# Patient Record
Sex: MALE | Race: WHITE | NOT HISPANIC OR LATINO | URBAN - METROPOLITAN AREA
[De-identification: names, ages, dates, MRNs, and addresses within clinical notes are randomized per-mention and may not be internally consistent; named-entity substitution may affect disease eponyms.]

---

## 2022-08-10 ENCOUNTER — NEW PATIENT (OUTPATIENT)
Dept: URBAN - METROPOLITAN AREA CLINIC 21 | Facility: CLINIC | Age: 42
End: 2022-08-10

## 2022-08-10 DIAGNOSIS — H43.393: ICD-10-CM

## 2022-08-10 DIAGNOSIS — H52.223: ICD-10-CM

## 2022-08-10 PROCEDURE — 92004 COMPRE OPH EXAM NEW PT 1/>: CPT

## 2022-08-10 PROCEDURE — 92015 DETERMINE REFRACTIVE STATE: CPT

## 2022-08-10 PROCEDURE — 92250 FUNDUS PHOTOGRAPHY W/I&R: CPT

## 2022-08-10 ASSESSMENT — KERATOMETRY
OD_K1POWER_DIOPTERS: 43.00
OS_K1POWER_DIOPTERS: 43.25
OD_AXISANGLE_DEGREES: 172
OS_K2POWER_DIOPTERS: 44.00
OD_AXISANGLE2_DEGREES: 82
OS_AXISANGLE2_DEGREES: 109
OS_AXISANGLE_DEGREES: 19
OD_K2POWER_DIOPTERS: 44.00

## 2022-08-10 ASSESSMENT — TONOMETRY
OS_IOP_MMHG: 11
OD_IOP_MMHG: 14

## 2022-08-10 ASSESSMENT — VISUAL ACUITY
OD_SC: 20/20
OS_SC: 20/20
OU_SC: 20/20

## 2022-08-16 ENCOUNTER — OFFICE VISIT (OUTPATIENT)
Dept: URBAN - METROPOLITAN AREA CLINIC 72 | Facility: CLINIC | Age: 42
End: 2022-08-16

## 2022-11-11 ENCOUNTER — OFFICE VISIT (OUTPATIENT)
Dept: URBAN - METROPOLITAN AREA CLINIC 72 | Facility: CLINIC | Age: 42
End: 2022-11-11

## 2024-01-24 ENCOUNTER — OFFICE VISIT (OUTPATIENT)
Dept: URBAN - METROPOLITAN AREA CLINIC 98 | Facility: CLINIC | Age: 44
End: 2024-01-24

## 2024-02-06 ENCOUNTER — OV NP (OUTPATIENT)
Dept: URBAN - METROPOLITAN AREA CLINIC 98 | Facility: CLINIC | Age: 44
End: 2024-02-06

## 2024-02-26 ENCOUNTER — OV NP (OUTPATIENT)
Dept: URBAN - METROPOLITAN AREA CLINIC 98 | Facility: CLINIC | Age: 44
End: 2024-02-26
Payer: COMMERCIAL

## 2024-02-26 VITALS
BODY MASS INDEX: 22.89 KG/M2 | DIASTOLIC BLOOD PRESSURE: 85 MMHG | HEART RATE: 64 BPM | HEIGHT: 78 IN | SYSTOLIC BLOOD PRESSURE: 122 MMHG | WEIGHT: 197.8 LBS | TEMPERATURE: 97 F

## 2024-02-26 DIAGNOSIS — Z79.620 LONG TERM (CURRENT) USE OF IMMUNOSUPPRESSIVE BIOLOGIC: ICD-10-CM

## 2024-02-26 DIAGNOSIS — K50.80 CROHN'S DISEASE OF BOTH SMALL AND LARGE INTESTINE WITHOUT COMPLICATION: ICD-10-CM

## 2024-02-26 DIAGNOSIS — Z80.0 FAMILY HX OF COLON CANCER: ICD-10-CM

## 2024-02-26 PROBLEM — 71833008: Status: ACTIVE | Noted: 2024-02-26

## 2024-02-26 PROCEDURE — 99205 OFFICE O/P NEW HI 60 MIN: CPT | Performed by: INTERNAL MEDICINE

## 2024-02-26 RX ORDER — ADALIMUMAB 40MG/0.4ML
INJECT 40 MG KIT SUBCUTANEOUS EVERY OTHER WEEK
Status: ACTIVE | COMMUNITY

## 2024-02-26 NOTE — HPI-TODAY'S VISIT:
44 yo male with CD dx 2016 comes in for continue CD care. Was recently seen at [place] by Geoff Billy at [place]. Last colonoscopy was by dr. Billy but by Dr. Rg due to no sedation. Findings showed nl exam except for pseudopolyps in the sigmoid Last colonoscopy was 11/28/28.  No other family members with IBD. Father recently dx with colon cancer at age 73, no IBD. He had polyps in the past.  No IBD syx. Chronic gas.  No other medical conditions.  Received Pred for a months and Humira induction and maintenance starting 2016, at time of dx. Lab review from 2022 shows Vit D of 19 and is on 50,000 q week and 2000 q day Last calprotectin in 2022 was 100.(not deep remission) Nl esr and crp.

## 2024-02-26 NOTE — PHYSICAL EXAM RECTAL:
normal tone, no external hemorrhoids, no masses palpable, no red blood, Tenderness on DORIAN, Internal hemorrhoids present

## 2024-03-12 ENCOUNTER — LAB (OUTPATIENT)
Dept: URBAN - METROPOLITAN AREA CLINIC 4 | Facility: CLINIC | Age: 44
End: 2024-03-12
Payer: COMMERCIAL

## 2024-03-12 ENCOUNTER — COL/EGD (OUTPATIENT)
Dept: URBAN - METROPOLITAN AREA SURGERY CENTER 18 | Facility: SURGERY CENTER | Age: 44
End: 2024-03-12
Payer: COMMERCIAL

## 2024-03-12 DIAGNOSIS — K50.10 CC (CROHN'S COLITIS): ICD-10-CM

## 2024-03-12 DIAGNOSIS — K63.89 OTHER SPECIFIED DISEASES OF INTESTINE: ICD-10-CM

## 2024-03-12 DIAGNOSIS — K31.89 OTHER DISEASES OF STOMACH AND DUODENUM: ICD-10-CM

## 2024-03-12 DIAGNOSIS — K21.9 ACID REFLUX: ICD-10-CM

## 2024-03-12 DIAGNOSIS — K29.70 GASTRITIS, UNSPECIFIED, WITHOUT BLEEDING: ICD-10-CM

## 2024-03-12 DIAGNOSIS — K29.80 ACUTE DUODENITIS: ICD-10-CM

## 2024-03-12 PROCEDURE — G8907 PT DOC NO EVENTS ON DISCHARG: HCPCS | Performed by: INTERNAL MEDICINE

## 2024-03-12 PROCEDURE — 88312 SPECIAL STAINS GROUP 1: CPT | Performed by: PATHOLOGY

## 2024-03-12 PROCEDURE — 43239 EGD BIOPSY SINGLE/MULTIPLE: CPT | Performed by: INTERNAL MEDICINE

## 2024-03-12 PROCEDURE — 88305 TISSUE EXAM BY PATHOLOGIST: CPT | Performed by: PATHOLOGY

## 2024-03-12 PROCEDURE — 45380 COLONOSCOPY AND BIOPSY: CPT | Performed by: INTERNAL MEDICINE

## 2024-03-12 RX ORDER — ADALIMUMAB 40MG/0.4ML
INJECT 40 MG KIT SUBCUTANEOUS EVERY OTHER WEEK
Qty: 6 PEN NEEDLE | Refills: 3 | Status: ACTIVE | COMMUNITY
End: 2025-02-03

## 2024-05-01 ENCOUNTER — WEB ENCOUNTER (OUTPATIENT)
Dept: URBAN - METROPOLITAN AREA CLINIC 98 | Facility: CLINIC | Age: 44
End: 2024-05-01

## 2024-05-01 RX ORDER — ADALIMUMAB-ADAZ 40 MG/.4ML
INJECT 40 MG INJECTION, SOLUTION SUBCUTANEOUS EVERY OTHER WEEK
Qty: 6 PEN NEEDLE | Refills: 3 | OUTPATIENT
Start: 2024-05-02 | End: 2025-04-03

## 2024-05-01 RX ORDER — ADALIMUMAB 40MG/0.4ML
INJECT 40 MG KIT SUBCUTANEOUS EVERY OTHER WEEK
Qty: 6 PEN NEEDLE | Refills: 3 | Status: DISCONTINUED | COMMUNITY
End: 2025-02-03

## 2024-05-28 ENCOUNTER — WEB ENCOUNTER (OUTPATIENT)
Dept: URBAN - METROPOLITAN AREA CLINIC 98 | Facility: CLINIC | Age: 44
End: 2024-05-28

## 2024-05-28 RX ORDER — ADALIMUMAB 40MG/0.4ML
INJECT 40 MG KIT SUBCUTANEOUS EVERY OTHER WEEK
Qty: 6 PEN NEEDLE | Refills: 3 | OUTPATIENT
Start: 2024-05-29 | End: 2025-04-30

## 2024-07-17 ENCOUNTER — TELEPHONE ENCOUNTER (OUTPATIENT)
Dept: URBAN - METROPOLITAN AREA CLINIC 98 | Facility: CLINIC | Age: 44
End: 2024-07-17

## 2024-07-17 RX ORDER — ADALIMUMAB-ADAZ 40 MG/.4ML
INJECT 40 MG INJECTION, SOLUTION SUBCUTANEOUS EVERY OTHER WEEK
Qty: 6 PEN NEEDLE | Refills: 3 | Status: ACTIVE | COMMUNITY
Start: 2024-05-02 | End: 2025-06-18

## 2024-07-17 RX ORDER — ADALIMUMAB 40MG/0.4ML
INJECT 40 MG KIT SUBCUTANEOUS EVERY OTHER WEEK
Qty: 6 PEN NEEDLE | Refills: 3 | Status: DISCONTINUED | COMMUNITY
Start: 2024-05-29 | End: 2025-04-30

## 2024-07-17 RX ORDER — ADALIMUMAB-ADAZ 40 MG/.4ML
INJECT 40 MG INJECTION, SOLUTION SUBCUTANEOUS EVERY OTHER WEEK
Qty: 6 PEN NEEDLE | Refills: 3
Start: 2024-05-02 | End: 2025-06-18

## 2024-07-18 ENCOUNTER — TELEPHONE ENCOUNTER (OUTPATIENT)
Dept: URBAN - METROPOLITAN AREA CLINIC 98 | Facility: CLINIC | Age: 44
End: 2024-07-18

## 2024-07-18 RX ORDER — ADALIMUMAB-ADAZ 40 MG/.4ML
INJECT 40 MG INJECTION, SOLUTION SUBCUTANEOUS EVERY OTHER WEEK
Qty: 6 PEN NEEDLE | Refills: 3
Start: 2024-05-02 | End: 2025-06-19

## 2024-07-19 ENCOUNTER — WEB ENCOUNTER (OUTPATIENT)
Dept: URBAN - METROPOLITAN AREA CLINIC 98 | Facility: CLINIC | Age: 44
End: 2024-07-19

## 2024-07-23 ENCOUNTER — TELEPHONE ENCOUNTER (OUTPATIENT)
Dept: URBAN - METROPOLITAN AREA CLINIC 98 | Facility: CLINIC | Age: 44
End: 2024-07-23

## 2024-07-23 RX ORDER — ADALIMUMAB 40MG/0.4ML
INJECT 40 MG KIT SUBCUTANEOUS EVERY OTHER WEEK
Qty: 6 PEN NEEDLE | Refills: 3
End: 2025-06-24

## 2024-07-23 RX ORDER — ADALIMUMAB 40MG/0.4ML
INJECT 40 MG KIT SUBCUTANEOUS EVERY OTHER WEEK
Status: ACTIVE | COMMUNITY

## 2024-07-23 RX ORDER — ADALIMUMAB 40MG/0.4ML
INJECT 40 MG KIT SUBCUTANEOUS EVERY OTHER WEEK
Qty: 6 PEN NEEDLE | Refills: 3

## 2024-07-23 RX ORDER — ADALIMUMAB-ADAZ 40 MG/.4ML
INJECT 40 MG INJECTION, SOLUTION SUBCUTANEOUS EVERY OTHER WEEK
Qty: 6 PEN NEEDLE | Refills: 3 | Status: DISCONTINUED | COMMUNITY
Start: 2024-05-02 | End: 2025-06-19

## 2024-09-18 ENCOUNTER — TELEPHONE ENCOUNTER (OUTPATIENT)
Dept: URBAN - METROPOLITAN AREA CLINIC 98 | Facility: CLINIC | Age: 44
End: 2024-09-18

## 2024-09-18 RX ORDER — ADALIMUMAB-ADAZ 40 MG/.4ML
INJECT 40 MG INJECTION, SOLUTION SUBCUTANEOUS EVERY OTHER WEEK
Qty: 6 PEN NEEDLE | Refills: 3 | OUTPATIENT
Start: 2024-09-18 | End: 2025-08-20

## 2024-10-02 ENCOUNTER — TELEPHONE ENCOUNTER (OUTPATIENT)
Dept: URBAN - METROPOLITAN AREA CLINIC 98 | Facility: CLINIC | Age: 44
End: 2024-10-02

## 2024-10-02 RX ORDER — ADALIMUMAB-ADAZ 40 MG/.4ML
INJECT 40 MG INJECTION, SOLUTION SUBCUTANEOUS EVERY OTHER WEEK
Qty: 6 PEN NEEDLE | Refills: 3
Start: 2024-09-18 | End: 2025-09-03

## 2024-10-03 ENCOUNTER — TELEPHONE ENCOUNTER (OUTPATIENT)
Dept: URBAN - METROPOLITAN AREA CLINIC 98 | Facility: CLINIC | Age: 44
End: 2024-10-03

## 2025-06-24 ENCOUNTER — TELEPHONE ENCOUNTER (OUTPATIENT)
Dept: URBAN - METROPOLITAN AREA CLINIC 98 | Facility: CLINIC | Age: 45
End: 2025-06-24

## 2025-06-24 ENCOUNTER — WEB ENCOUNTER (OUTPATIENT)
Dept: URBAN - METROPOLITAN AREA CLINIC 98 | Facility: CLINIC | Age: 45
End: 2025-06-24

## 2025-06-24 RX ORDER — METHYLPREDNISOLONE 4 MG/1
AS DIRECTED TABLET ORAL ONCE DAILY
Qty: 21 TABLETS | Refills: 0 | OUTPATIENT
Start: 2025-06-24

## 2025-06-26 ENCOUNTER — TELEPHONE ENCOUNTER (OUTPATIENT)
Dept: URBAN - METROPOLITAN AREA CLINIC 98 | Facility: CLINIC | Age: 45
End: 2025-06-26

## 2025-06-26 ENCOUNTER — OFFICE VISIT (OUTPATIENT)
Dept: URBAN - METROPOLITAN AREA CLINIC 98 | Facility: CLINIC | Age: 45
End: 2025-06-26
Payer: COMMERCIAL

## 2025-06-26 ENCOUNTER — LAB OUTSIDE AN ENCOUNTER (OUTPATIENT)
Dept: URBAN - METROPOLITAN AREA CLINIC 98 | Facility: CLINIC | Age: 45
End: 2025-06-26

## 2025-06-26 ENCOUNTER — DASHBOARD ENCOUNTERS (OUTPATIENT)
Age: 45
End: 2025-06-26

## 2025-06-26 DIAGNOSIS — Z79.620 LONG TERM (CURRENT) USE OF IMMUNOSUPPRESSIVE BIOLOGIC: ICD-10-CM

## 2025-06-26 DIAGNOSIS — K21.9 ACID REFLUX: ICD-10-CM

## 2025-06-26 DIAGNOSIS — K50.80 CROHN'S DISEASE OF BOTH SMALL AND LARGE INTESTINE WITHOUT COMPLICATION: ICD-10-CM

## 2025-06-26 PROCEDURE — 99215 OFFICE O/P EST HI 40 MIN: CPT | Performed by: INTERNAL MEDICINE

## 2025-06-26 RX ORDER — PREDNISONE 10 MG/1
6 TABS DAILY WEEK 1, 5 TABS DAILY WEEK 2, 4 TABS DAILY WEEK 3, 3 TABS DAILY WEEK 4, 2 TABS DAILY WEEK 5, 1 TAB DAILY WEEK 6 TABLET ORAL ONCE A DAY
Qty: 147 TABLETS | Refills: 0 | OUTPATIENT
Start: 2025-06-26

## 2025-06-26 RX ORDER — METHYLPREDNISOLONE 4 MG/1
AS DIRECTED TABLET ORAL ONCE DAILY
Qty: 21 TABLETS | Refills: 0 | Status: ACTIVE | COMMUNITY
Start: 2025-06-24

## 2025-06-26 RX ORDER — ADALIMUMAB-ADAZ 40 MG/.4ML
INJECT 40 MG INJECTION, SOLUTION SUBCUTANEOUS EVERY OTHER WEEK
Qty: 6 PEN NEEDLE | Refills: 3 | Status: ACTIVE | COMMUNITY
Start: 2024-09-18 | End: 2025-09-03

## 2025-06-26 NOTE — PHYSICAL EXAM GASTROINTESTINAL
soft, nontender, nondistended, no guarding or rigidity, no masses palpable, normal bowel sounds, no hepatosplenomegaly, no rebound tenderness
bili check

## 2025-06-26 NOTE — HPI-TODAY'S VISIT:
45 yo male with CD dx 2016, with Vickie (spouse), comes in for 1.5 year f/u. Lab review from 2/24 show perfect results and ADA of 9.0 on qow dosing and all nl biomarkers.  REvw of EGD and colon on 2/12/24 showed mild reflux on egd, and all nl visualiza and bx to the TI, no erosions on bx, no inflammation on bx. 1 week ago, had one episode of blood iin stool and then 5 days ago and blood once of 2 BM. Some blood on tissue but  4 days ago, developed abd pain. Night beofre last had pain. No blood and no BM for 5 days. Eating ok, but less. Forcing self to eat. Eating softer foods.  He came by here 2 days ago, got a rx of prednisone 2 days ago, took hyrimoz but had a lapse. Took ferrous sulfate elixir couple days ago. feels lightheaded.  Was off Hyrimoz for months Last order was Jan. 29. Off Hyrimoz for 5 months. The patient support program went from $5, and then the cost this year went to $1000. Was in the copay program.  Only biologic is Humira.  Short term mtx discussed and prefer not. Discussed pred pre-tx for day before, day or, day after for next 4 doses of hyrimoz ========================== 2/26/24  44 yo male with CD dx 2016 comes in for continue CD care. Was recently seen at [place] by Geoff Billy at [place]. Last colonoscopy was by dr. Billy but by Dr. Rg due to no sedation. Findings showed nl exam except for pseudopolyps in the sigmoid Last colonoscopy was 11/28/28.  No other family members with IBD. Father recently dx with colon cancer at age 73, no IBD. He had polyps in the past.  No IBD syx. Chronic gas.  No other medical conditions.  Received Pred for a months and Humira induction and maintenance starting 2016, at time of dx. Lab review from 2022 shows Vit D of 19 and is on 50,000 q week and 2000 q day Last calprotectin in 2022 was 100.(not deep remission) Nl esr and crp.

## 2025-06-29 ENCOUNTER — WEB ENCOUNTER (OUTPATIENT)
Dept: URBAN - METROPOLITAN AREA CLINIC 98 | Facility: CLINIC | Age: 45
End: 2025-06-29

## 2025-06-29 RX ORDER — ADALIMUMAB-ADAZ 40 MG/.4ML
INJECT 40 MG INJECTION, SOLUTION SUBCUTANEOUS EVERY OTHER WEEK
Qty: 6 PEN NEEDLE | Refills: 3
Start: 2024-09-18 | End: 2026-06-01

## 2025-06-29 RX ORDER — ADALIMUMAB-ADAZ 80 MG/.8ML
INJECT 160 MG DAY1, THEN 80 MG DAY 15 INJECTION, SOLUTION SUBCUTANEOUS
Qty: 1 KIT | Refills: 0 | OUTPATIENT
Start: 2025-06-30 | End: 2025-07-15

## 2025-07-03 ENCOUNTER — OFFICE VISIT (OUTPATIENT)
Dept: URBAN - METROPOLITAN AREA SURGERY CENTER 18 | Facility: SURGERY CENTER | Age: 45
End: 2025-07-03
Payer: COMMERCIAL

## 2025-07-03 ENCOUNTER — CLAIMS CREATED FROM THE CLAIM WINDOW (OUTPATIENT)
Dept: URBAN - METROPOLITAN AREA CLINIC 4 | Facility: CLINIC | Age: 45
End: 2025-07-03
Payer: COMMERCIAL

## 2025-07-03 DIAGNOSIS — K63.89 OTHER SPECIFIED DISEASES OF INTESTINE: ICD-10-CM

## 2025-07-03 DIAGNOSIS — K50.90 CROHN'S DISEASE WITHOUT COMPLICATION, UNSPECIFIED GASTROINTESTINAL TRACT LOCATION: ICD-10-CM

## 2025-07-03 PROCEDURE — 45380 COLONOSCOPY AND BIOPSY: CPT | Performed by: INTERNAL MEDICINE

## 2025-07-03 PROCEDURE — 88305 TISSUE EXAM BY PATHOLOGIST: CPT | Performed by: PATHOLOGY

## 2025-07-03 RX ORDER — PREDNISONE 10 MG/1
6 TABS DAILY WEEK 1, 5 TABS DAILY WEEK 2, 4 TABS DAILY WEEK 3, 3 TABS DAILY WEEK 4, 2 TABS DAILY WEEK 5, 1 TAB DAILY WEEK 6 TABLET ORAL ONCE A DAY
Qty: 147 TABLETS | Refills: 0 | Status: ACTIVE | COMMUNITY
Start: 2025-06-26

## 2025-07-03 RX ORDER — ADALIMUMAB-ADAZ 40 MG/.4ML
INJECT 40 MG INJECTION, SOLUTION SUBCUTANEOUS EVERY OTHER WEEK
Qty: 6 PEN NEEDLE | Refills: 3 | Status: ACTIVE | COMMUNITY
Start: 2024-09-18 | End: 2026-06-01

## 2025-07-03 RX ORDER — ADALIMUMAB-ADAZ 80 MG/.8ML
INJECT 160 MG DAY1, THEN 80 MG DAY 15 INJECTION, SOLUTION SUBCUTANEOUS
Qty: 1 KIT | Refills: 0 | Status: ACTIVE | COMMUNITY
Start: 2025-06-30 | End: 2025-07-15

## 2025-07-03 RX ORDER — METHYLPREDNISOLONE 4 MG/1
AS DIRECTED TABLET ORAL ONCE DAILY
Qty: 21 TABLETS | Refills: 0 | Status: ACTIVE | COMMUNITY
Start: 2025-06-24

## 2025-07-07 ENCOUNTER — WEB ENCOUNTER (OUTPATIENT)
Dept: URBAN - METROPOLITAN AREA CLINIC 98 | Facility: CLINIC | Age: 45
End: 2025-07-07

## 2025-07-07 RX ORDER — ADALIMUMAB-ADAZ 80 MG/.8ML
INJECT 160 MG DAY1, THEN 80 MG DAY 15 INJECTION, SOLUTION SUBCUTANEOUS
Qty: 1 KIT | Refills: 0
Start: 2025-06-30 | End: 2025-07-23

## 2025-07-07 RX ORDER — ADALIMUMAB-ADAZ 40 MG/.4ML
INJECT 40 MG INJECTION, SOLUTION SUBCUTANEOUS EVERY OTHER WEEK
Qty: 6 PEN NEEDLE | Refills: 3
Start: 2024-09-18 | End: 2026-06-09

## 2025-07-23 ENCOUNTER — WEB ENCOUNTER (OUTPATIENT)
Dept: URBAN - METROPOLITAN AREA CLINIC 98 | Facility: CLINIC | Age: 45
End: 2025-07-23

## 2025-07-23 RX ORDER — ADALIMUMAB-ADAZ 80 MG/.8ML
INJECT 160 MG DAY 1, THEN 80 MG DAY 15 INJECTION, SOLUTION SUBCUTANEOUS
Qty: 1 KIT | Refills: 0 | OUTPATIENT
Start: 2025-07-25 | End: 2025-08-09

## 2025-07-23 RX ORDER — ADALIMUMAB-ADAZ 40 MG/.4ML
INJECT 40 MG INJECTION, SOLUTION SUBCUTANEOUS EVERY OTHER WEEK
Qty: 6 PEN NEEDLE | Refills: 3
Start: 2024-09-18 | End: 2026-06-26

## 2025-07-25 ENCOUNTER — TELEPHONE ENCOUNTER (OUTPATIENT)
Dept: URBAN - METROPOLITAN AREA CLINIC 98 | Facility: CLINIC | Age: 45
End: 2025-07-25

## 2025-07-25 RX ORDER — ADALIMUMAB-ADAZ 40 MG/.4ML
INJECT 40 MG INJECTION, SOLUTION SUBCUTANEOUS EVERY OTHER WEEK
Qty: 6 PEN NEEDLE | Refills: 3
Start: 2024-09-18 | End: 2026-06-26

## 2025-07-25 RX ORDER — ADALIMUMAB-ADAZ 80 MG/.8ML
INJECT 160 MG DAY 1, THEN 80 MG DAY 15 INJECTION, SOLUTION SUBCUTANEOUS
Qty: 1 KIT | Refills: 0
Start: 2025-07-25 | End: 2025-08-09

## 2025-07-27 ENCOUNTER — ERX REFILL RESPONSE (OUTPATIENT)
Dept: URBAN - METROPOLITAN AREA CLINIC 98 | Facility: CLINIC | Age: 45
End: 2025-07-27

## 2025-07-27 RX ORDER — PREDNISONE 10 MG/1
TAKE 6 TABS ORALLY ONCE DAILY X 1 WEEK, 5 TABS ONCE DAILY X 1 WEEK, 4 TABS ONCE DAILY X 1 WEEK, 3 TABS ONCE DAILY X 1 WEEK, 2 TABS ONCE DAILY X 1 WEEK, THEN 1 TAB ONCE DAILY X 1 WEEK TABLET ORAL
Qty: 147 TABLET | Refills: 0

## 2025-07-30 ENCOUNTER — TELEPHONE ENCOUNTER (OUTPATIENT)
Dept: URBAN - METROPOLITAN AREA CLINIC 98 | Facility: CLINIC | Age: 45
End: 2025-07-30

## 2025-07-30 RX ORDER — ADALIMUMAB-ADAZ 80 MG/.8ML
INJECT 160 MG DAY 1, THEN 80 MG DAY 15 INJECTION, SOLUTION SUBCUTANEOUS
Qty: 1 KIT | Refills: 0
Start: 2025-07-25 | End: 2025-08-14

## 2025-07-30 RX ORDER — ADALIMUMAB-ADAZ 40 MG/.4ML
INJECT 40 MG INJECTION, SOLUTION SUBCUTANEOUS EVERY OTHER WEEK
Qty: 6 PEN NEEDLE | Refills: 3
Start: 2024-09-18 | End: 2026-07-01

## 2025-07-31 ENCOUNTER — OFFICE VISIT (OUTPATIENT)
Dept: URBAN - METROPOLITAN AREA CLINIC 98 | Facility: CLINIC | Age: 45
End: 2025-07-31
Payer: COMMERCIAL

## 2025-07-31 DIAGNOSIS — K21.9 ACID REFLUX: ICD-10-CM

## 2025-07-31 DIAGNOSIS — Z79.620 LONG TERM (CURRENT) USE OF IMMUNOSUPPRESSIVE BIOLOGIC: ICD-10-CM

## 2025-07-31 DIAGNOSIS — K50.80 CROHN'S DISEASE OF BOTH SMALL AND LARGE INTESTINE WITHOUT COMPLICATION: ICD-10-CM

## 2025-07-31 PROCEDURE — 99215 OFFICE O/P EST HI 40 MIN: CPT | Performed by: INTERNAL MEDICINE

## 2025-07-31 RX ORDER — ADALIMUMAB-ADAZ 80 MG/.8ML
INJECT 160 MG DAY 1, THEN 80 MG DAY 15 INJECTION, SOLUTION SUBCUTANEOUS
Qty: 1 KIT | Refills: 0 | Status: ACTIVE | COMMUNITY
Start: 2025-07-25 | End: 2025-08-14

## 2025-07-31 RX ORDER — PREDNISONE 10 MG/1
TAKE 6 TABS ORALLY ONCE DAILY X 1 WEEK, 5 TABS ONCE DAILY X 1 WEEK, 4 TABS ONCE DAILY X 1 WEEK, 3 TABS ONCE DAILY X 1 WEEK, 2 TABS ONCE DAILY X 1 WEEK, THEN 1 TAB ONCE DAILY X 1 WEEK TABLET ORAL
Qty: 147 TABLET | Refills: 0 | Status: ACTIVE | COMMUNITY

## 2025-07-31 RX ORDER — ADALIMUMAB-ADAZ 40 MG/.4ML
INJECT 40 MG INJECTION, SOLUTION SUBCUTANEOUS EVERY OTHER WEEK
Qty: 6 PEN NEEDLE | Refills: 3 | Status: ACTIVE | COMMUNITY
Start: 2024-09-18 | End: 2026-07-01

## 2025-07-31 NOTE — HPI-TODAY'S VISIT:
45 yo male with CD Getting better. On pred 30 mg a day Taper from 60 mg. Occ bleeding. Rectal prolapse decreasing  It has been 5 weeks since ada level of 2.1, I thought he would get continued ada, but it comes in 1 weeks and so will pre -tx with pred 60 and caution about calling 911 or calling us if he has sob or other rx.  Prev discussed continued hyrimoz vs other biologic and he wanted hyrimoz.  Will see back in 3 months if all is well, more immediate if an ada rx. Will get mre to assess SB at this time.    =========================== 6/26/25  45 yo male with CD dx 2016, with Vickie (spouse), comes in for 1.5 year f/u. Lab review from 2/24 show perfect results and ADA of 9.0 on qow dosing and all nl biomarkers.  REvw of EGD and colon on 2/12/24 showed mild reflux on egd, and all nl visualiza and bx to the TI, no erosions on bx, no inflammation on bx. 1 week ago, had one episode of blood iin stool and then 5 days ago and blood once of 2 BM. Some blood on tissue but  4 days ago, developed abd pain. Night beofre last had pain. No blood and no BM for 5 days. Eating ok, but less. Forcing self to eat. Eating softer foods.  He came by here 2 days ago, got a rx of prednisone 2 days ago, took hyrimoz but had a lapse. Took ferrous sulfate elixir couple days ago. feels lightheaded.  Was off Hyrimoz for months Last order was Jan. 29. Off Hyrimoz for 5 months. The patient support program went from $5, and then the cost this year went to $1000. Was in the copay program.  Only biologic is Humira.  Short term mtx discussed and prefer not. Discussed pred pre-tx for day before, day or, day after for next 4 doses of hyrimoz ========================== 2/26/24  44 yo male with CD dx 2016 comes in for continue CD care. Was recently seen at [place] by Geoff Billy at [place]. Last colonoscopy was by dr. Billy but by Dr. Rg due to no sedation. Findings showed nl exam except for pseudopolyps in the sigmoid Last colonoscopy was 11/28/28.  No other family members with IBD. Father recently dx with colon cancer at age 73, no IBD. He had polyps in the past.  No IBD syx. Chronic gas.  No other medical conditions.  Received Pred for a months and Humira induction and maintenance starting 2016, at time of dx. Lab review from 2022 shows Vit D of 19 and is on 50,000 q week and 2000 q day Last calprotectin in 2022 was 100.(not deep remission) Nl esr and crp.